# Patient Record
Sex: FEMALE | Race: WHITE
[De-identification: names, ages, dates, MRNs, and addresses within clinical notes are randomized per-mention and may not be internally consistent; named-entity substitution may affect disease eponyms.]

---

## 2018-03-09 ENCOUNTER — HOSPITAL ENCOUNTER (EMERGENCY)
Dept: HOSPITAL 5 - ED | Age: 23
LOS: 1 days | Discharge: HOME | End: 2018-03-10
Payer: MEDICAID

## 2018-03-09 VITALS — DIASTOLIC BLOOD PRESSURE: 61 MMHG | SYSTOLIC BLOOD PRESSURE: 123 MMHG

## 2018-03-09 DIAGNOSIS — R21: ICD-10-CM

## 2018-03-09 DIAGNOSIS — Z3A.08: ICD-10-CM

## 2018-03-09 DIAGNOSIS — O99.711: Primary | ICD-10-CM

## 2018-03-09 PROCEDURE — 99282 EMERGENCY DEPT VISIT SF MDM: CPT

## 2018-03-10 NOTE — EMERGENCY DEPARTMENT REPORT
ED Rash HPI





- HPI


Chief Complaint: Skin Rash


Stated Complaint: BODY RASH


Time Seen by Provider: 03/10/18 02:48


Duration: Today


Location: Head, Upper Extremities


Suspected Cause: Other (possible shampoo)


Rash Symptoms: No Itching, No Facial Swelling, No Tongue/Oral Swelling, No 

Breathing Difficulties, No Choking Sensation, No Wheezing/Dyspnea, No Peeling, 

No Blistering, No Fever, No Lightheaded, No Malaise, No Myalgias


Severity: mild


Other History: 22-year-old  female that is 8 weeks pregnant comes in 

today noticing a rash in her upper extremities after taking a shower tonight.  

Patient reports that she had used some shampoo in the shower when she got out 

she noticed that her hands and upper extremities had a rash.  Patient denies 

any shortness of breath denies any chest pain denies any nausea no vomiting.  

She denies any new soaps new foods no past medical history currently takes no 

medications besides prenatal.  Has no known drug allergies.  She is followed by 

[facility] health in [place].





ED Review of Systems


ROS: 


Stated complaint: BODY RASH


Other details as noted in HPI





Constitutional: denies: chills, fever


Eyes: denies: eye pain, eye discharge, vision change


ENT: denies: ear pain, throat pain


Respiratory: denies: cough, shortness of breath, wheezing


Cardiovascular: denies: chest pain, palpitations


Endocrine: no symptoms reported


Gastrointestinal: denies: abdominal pain, nausea, diarrhea


Genitourinary: denies: urgency, dysuria, discharge


Musculoskeletal: denies: back pain, joint swelling, arthralgia


Skin: rash (upper extremities).  denies: lesions


Neurological: denies: headache, weakness, paresthesias


Psychiatric: denies: anxiety, depression


Hematological/Lymphatic: denies: easy bleeding, easy bruising





ED Past Medical Hx





- Past Medical History


Previous Medical History?: No


Hx Hypertension: No


Hx CVA: No


Hx Heart Attack/AMI: No


Hx Congestive Heart Failure: No


Hx Diabetes: No


Hx Deep Vein Thrombosis: No


Hx Pulmonary Embolism: No


Hx GERD: No


Hx Liver Disease: No


Hx Renal Disease: No


Hx Sickle Cell Disease: No


Hx Arthritis: No


Hx Headaches / Migraines: No


Hx Seizures: No


Hx Kidney Stones: No


Hx Psychiatric Treatment: No


Hx Asthma: No


Hx COPD: No


Hx Tuberculosis: No


Hx Dementia: No


Hx HIV: No





- Surgical History


Past Surgical History?: No


Hx Coronary Stent: No


Hx Open Heart Surgery: No


Hx Pacemaker: No


Hx Internal Defibrillator: No


Hx Cholecystectomy: No


Hx Appendectomy: No


Hx Breast Surgery: No





- Social History


Smoking Status: Never Smoker


Substance Use Type: None





- Medications


Home Medications: 


 Home Medications











 Medication  Instructions  Recorded  Confirmed  Last Taken  Type


 


Acetaminophen [Acetaminophen  mg PO Q8HR PRN #90 tablet.er 09/07/15 04/25/

16 Unknown Rx





TAB]     


 


Ondansetron [Zofran Odt] 4 mg PO Q4H PRN #30 tab.rapdis 09/07/15 04/25/16 

Unknown Rx


 


Ibuprofen [Motrin 600 MG tab] 600 mg PO Q6H 30 Days  tablet 04/26/16  Unknown Rx


 


oxyCODONE /ACETAMINOPHEN [Percocet 2 tab PO Q6H PRN #30 tablet 04/26/16  

Unknown Rx





5/325 mg]     














Rash Exam





- Exam


General: 


Vital signs noted. No distress. Alert and acting appropriately.





HEENT: No Periorbital Edema, No Conjuctival Injection, No Chemosis, No Perioral 

Edema, No Tongue Edema, No Uvular Edema, No Compromised Airway, No Drooling


Lungs: Yes Good Air Exchange (Normal Breath Sounds), No Wheezes, No Ronchi, No 

Stridor, No Cough, No Labored Respirations, No Retractions, No Use of Accessory 

Muscles, No Other Abnormal Lung Sounds


Heart: Yes Regular, No Murmur


Skin: Yes Maculopapular Rash, Yes Erythema, No Urticarial Rash, No Morbilliform 

rash, No Bulla(e), No Excoriations, No Weeping, No Tenderness, No Edema, No 

Encrustations, No Other


Other: Positive: Abdomen Normal, Neurologic Normal, Musculoskeletal Normal





ED Course


 Vital Signs











  03/09/18 03/09/18





  22:47 22:53


 


Temperature 98.2 F 98.2 F


 


Pulse Rate 16 L 78


 


Respiratory 16 16





Rate  


 


Blood Pressure  123/61


 


Blood Pressure 123/61 





[Right]  


 


O2 Sat by Pulse 98 98





Oximetry  














ED Medical Decision Making





- Medical Decision Making





Patient has been evaluated by this provider fast track.


Examine patient's scalp was noted that her scalp was read as well to patient 

denies any itchiness with this rash.  Discussed the patient to avoid the 

shampoo.  And follow-up with her OB/GYN.  Patient verbalized understanding.








Critical care attestation.: 


If time is entered above; I have spent that time in minutes in the direct care 

of this critically ill patient, excluding procedure time.








ED Disposition


Clinical Impression: 


 Rash and nonspecific skin eruption





Disposition: DC-01 TO HOME OR SELFCARE


Is pt being admited?: No


Does the pt Need Aspirin: No


Condition: Stable


Instructions:  Acute Rash (ED)


Additional Instructions: 


Please avoid using the shampoo.  Please contact her OB/GYN provider on Monday 

if rash persists or gets worse.


Referrals: 


LUCY IHRSCH MD [Primary Care Provider] - 3-5 Days


Forms:  Work/School Release Form(ED), Accompanied Note